# Patient Record
Sex: MALE | Employment: UNEMPLOYED | URBAN - METROPOLITAN AREA
[De-identification: names, ages, dates, MRNs, and addresses within clinical notes are randomized per-mention and may not be internally consistent; named-entity substitution may affect disease eponyms.]

---

## 2020-01-01 ENCOUNTER — HOSPITAL ENCOUNTER (EMERGENCY)
Age: 70
End: 2020-10-09

## 2020-01-01 DIAGNOSIS — I46.9 CARDIAC ARREST (HCC): Primary | ICD-10-CM

## 2020-01-01 PROCEDURE — 92950 HEART/LUNG RESUSCITATION CPR: CPT

## 2020-01-01 PROCEDURE — 74011000250 HC RX REV CODE- 250: Performed by: EMERGENCY MEDICINE

## 2020-01-01 PROCEDURE — 99283 EMERGENCY DEPT VISIT LOW MDM: CPT

## 2020-01-01 PROCEDURE — 74011250636 HC RX REV CODE- 250/636: Performed by: EMERGENCY MEDICINE

## 2020-01-01 RX ORDER — SODIUM BICARBONATE 1 MEQ/ML
SYRINGE (ML) INTRAVENOUS
Status: COMPLETED | OUTPATIENT
Start: 2020-01-01 | End: 2020-01-01

## 2020-01-01 RX ORDER — EPINEPHRINE 0.1 MG/ML
INJECTION INTRACARDIAC; INTRAVENOUS
Status: COMPLETED | OUTPATIENT
Start: 2020-01-01 | End: 2020-01-01

## 2020-01-01 RX ADMIN — EPINEPHRINE 1 MG: 0.1 INJECTION, SOLUTION ENDOTRACHEAL; INTRACARDIAC; INTRAVENOUS at 03:46

## 2020-01-01 RX ADMIN — EPINEPHRINE 1 MG: 0.1 INJECTION, SOLUTION ENDOTRACHEAL; INTRACARDIAC; INTRAVENOUS at 03:48

## 2020-01-01 RX ADMIN — SODIUM BICARBONATE 50 MEQ: 84 INJECTION, SOLUTION INTRAVENOUS at 03:49

## 2020-10-09 NOTE — ED PROVIDER NOTES
EMERGENCY DEPARTMENT HISTORY AND PHYSICAL EXAM  
 
------------------------------------------------------------------------------------------------------ Please note that this dictation was completed with [employer], the [employer] voice recognition software. Quite often unanticipated grammatical, syntax, homophones, and other interpretive errors are inadvertently transcribed by the computer software. Please disregard these errors. Please excuse any errors that have escaped final proofreading. 
----------------------------------------------------------------------------------------------------------------- 
 
Date: 10/9/2020 Patient Name: Kitty Davis History of Presenting Illness No chief complaint on file. History Provided By: EMS 
 
HPI: Kitty Davis is a 69 y.o. male, with significant pmhx of reflux, who presents via EMS to the ED with report of witnessed arrest at home. Wife performed bystander CPR after calling 911 and patient was unresponsive. EMS notes asystole on their arrival with ACLS protocols initiated. Patient with shockable rhythm prior to arriving to the emergency department receiving defibrillation. Noted to have adequate blood sugar. Patient without Rosc and return to PEA. CPR provided by auto pulse machinery. Mathew airway in place with bilateral chest rise on bagging. Humerus IO infusing adequately. On arrival to the emergency department Patient remains pulseless in asystole. Additional rounds of epinephrine and CPR initiated. After several rounds patient remains in asystole with cardiac standstill on bedside echo. Patient with continuous resuscitation efforts for approximately 1 hour without return of spontaneous circulation in any time. Of death 64. Allergies not on file Past History Past Medical History: No past medical history on file. Past Surgical History: No past surgical history on file. Family History: No family history on file. Social History: 
Social History Tobacco Use  Smoking status: Not on file Substance Use Topics  Alcohol use: Not on file  Drug use: Not on file Allergies: Allergies not on file Review of Systems Review of Systems Unable to perform ROS: Intubated Physical Exam  
Physical Exam 
Vitals signs and nursing note reviewed. Constitutional:   
   Appearance: He is well-developed. Interventions: He is intubated. Cardiovascular:  
   Pulses:     
     Carotid pulses are 0 on the right side and 0 on the left side. Femoral pulses are 0 on the right side and 0 on the left side. Pulmonary:  
   Effort: He is intubated. Neurological:  
   GCS: GCS eye subscore is 1. GCS verbal subscore is 1. GCS motor subscore is 1. Diagnostic Study Results Labs - No results found for this or any previous visit (from the past 12 hour(s)). Radiologic Studies - No orders to display CT Results  (Last 48 hours) None CXR Results  (Last 48 hours) None Medical Decision Making I am the first provider for this patient. I reviewed the vital signs, available nursing notes, past medical history, past surgical history, family history and social history. Vital Signs-Reviewed the patient's vital signs. No data found. Pulse Oximetry Analysis - 95% 100% FiO2 Provider Notes (Medical Decision Making): DDX: 
Cardiac arrest, malignant arrhythmia, respiratory arrest 
 
Plan: 
ACLS protocols Impression: 
Cardiac arrest 
 
ED Course: His wife presented to the emergency department and was notified of patient's passing.  services provided to patient. Critical Care Time:  
 
none Diagnosis Clinical Impression: 1. Cardiac arrest ([facility] [address] PLAN: 
1.  This note will not be viewable in [address]

## 2020-10-09 NOTE — PROGRESS NOTES
Provided support for the family of this pt who  earlier today. This support was offered in  myinfoQ. Family wanted time to process as a family but did request prayer. Offered pastoral support through prayer and pastoral presence. Assured pt's family of continued prayers and affirmed ongoing availability of support. Staci Quezada MDiv. Staff  Request  Support/Spiritual Care Services via [facility]

## 2020-10-09 NOTE — PROGRESS NOTES
Spiritual Care Assessment/Progress Note [address] 
 
 
NAME: Adarsh Weber      MRN: [id number] AGE: 69 y.o. SEX: male Baptism Affiliation: Unknown Language: English  
 
10/9/2020     Total Time (in minutes): (P) 90 Spiritual Assessment begun in [facility] EMERGENCY DEPT through conversation with: 
  
    [x]Patient        [x] Family    [] Friend(s) Reason for Consult: (P) Death, ER Spiritual beliefs: (Please include comment if needed) 
   [] Identifies with a marlene tradition:     
   [] Supported by a marlene community:        
   [] Claims no spiritual orientation:       
   [] Seeking spiritual identity:            
   [] Adheres to an individual form of spirituality:       
   [] Not able to assess:                   
 
    
Identified resources for coping:  
   [x] Prayer                           
   [] Music                  [] Guided Imagery [x] Family/friends                 [] Pet visits [] Devotional reading                         [] Unknown 
   [] Other:                                      
 
 
Interventions offered during this visit: (See comments for more details) Patient Interventions: (P) Initial visit, Prayer (actual) Family/Friend(s): (P) Affirmation of emotions/emotional suffering, Affirmation of marlene, Coping skills reviewed/reinforced, Prayer (actual) Plan of Care: 
 
 [] Support spiritual and/or cultural needs  
 [] Support AMD and/or advance care planning process [x] Support grieving process 
 [] Coordinate Rites and/or Rituals  
 [] Coordination with community clergy [] No spiritual needs identified at this time 
 [] Detailed Plan of Care below (See Comments)  [] Make referral to Music Therapy 
[] Make referral to Pet Therapy    
[] Make referral to Addiction services 
[] Make referral to [facility] 
[] Make referral to Spiritual Care Partner 
[] No future visits requested       
[] Follow up visits as needed Comments: Responded to page from [address] staff. Pt. Came in cardiac arrest and did not survive. Offered pastoral support for wife and her sister when they arrived. Present with family as physician broke the news. Escorted them to the room and offered words of comfort and support. Offered a prayer. Provided contact info for nursing supervisor for  home information. Contact info for Kale Castillo [phone]. Rev. Lupie Dose Paging Service: [phone])